# Patient Record
Sex: MALE | URBAN - METROPOLITAN AREA
[De-identification: names, ages, dates, MRNs, and addresses within clinical notes are randomized per-mention and may not be internally consistent; named-entity substitution may affect disease eponyms.]

---

## 2017-08-18 PROBLEM — 16331000 HEARTBURN: Status: ACTIVE | Noted: 2017-08-18

## 2021-11-18 ENCOUNTER — OFFICE VISIT (OUTPATIENT)
Dept: URBAN - METROPOLITAN AREA CLINIC 37 | Facility: CLINIC | Age: 56
End: 2021-11-18

## 2021-11-18 ENCOUNTER — LAB OUTSIDE AN ENCOUNTER (OUTPATIENT)
Dept: URBAN - METROPOLITAN AREA CLINIC 37 | Facility: CLINIC | Age: 56
End: 2021-11-18

## 2021-11-18 VITALS — BODY MASS INDEX: 21.82 KG/M2 | WEIGHT: 144 LBS | HEIGHT: 68 IN

## 2021-11-18 PROBLEM — 312824007: Status: ACTIVE | Noted: 2021-11-18

## 2021-11-18 PROBLEM — 59621000 ESSENTIAL HYPERTENSION: Status: ACTIVE | Noted: 2017-08-18

## 2021-11-18 PROBLEM — 429699009: Status: ACTIVE | Noted: 2021-11-18

## 2021-11-18 PROBLEM — 313436004 TYPE II DIABETES MELLITUS WITHOUT COMPLICATION: Status: ACTIVE | Noted: 2017-08-18

## 2021-11-18 PROBLEM — 266433003: Status: ACTIVE | Noted: 2021-11-18

## 2021-11-18 PROBLEM — 429699009 HISTORY OF MALIGNANT NEOPLASM OF COLON: Status: ACTIVE | Noted: 2017-08-18

## 2021-11-18 RX ORDER — EMPAGLIFLOZIN, METFORMIN HYDROCHLORIDE 12.5; 1 MG/1; MG/1
1 TABLET WITH BREAKFAST TABLET, EXTENDED RELEASE ORAL ONCE A DAY
Status: ACTIVE | COMMUNITY

## 2021-11-18 RX ORDER — DORZOLAMIDE HYDROCHLORIDE AND TIMOLOL MALEATE 20; 5 MG/ML; MG/ML
1 DROP INTO AFFECTED EYE SOLUTION/ DROPS OPHTHALMIC TWICE A DAY
Status: ACTIVE | COMMUNITY

## 2021-11-18 RX ORDER — TAFLUPROST 0 MG/.3ML
1 DROP INTO AFFECTED EYE IN THE EVENING SOLUTION/ DROPS OPHTHALMIC ONCE A DAY
Status: ACTIVE | COMMUNITY

## 2021-11-18 NOTE — HPI-MIGRATED HPI
Colorectal cancer screening : Patient is here for -> high risk surveillance colonoscopy due to personal history of colonic polyps;   Colorectal cancer screening : Last colonoscopy was -> 08/31/2017 performed by me at [facility], two polyps measuring 5 mm were detected and resected from the cecum (benign lymphoid aggregate x1) and sigmoid colon (benign polypoid mucosa, negative for adenoma x1). Patent end-to-side ileo-colonic anastomosis with normal mucosal appearance. Non-bleeding grade II internal hemorrhoids were found;   Colorectal cancer screening : Patients denies -> rectal bleeding, change in bowel habits, constipation, diarrhea, or abdominal pain;   Colorectal cancer screening : Family history of GI malignancy: -> Denies;   Colorectal cancer screening : Current bowel movement patterns -> 1 formed BM daily;   Follow up OV : Patient is here for a routine OV for -> GERD  Last EGD done 08/31/2017 due to heartburn, The esophagus was normal with bxx showing no diagnostic abnormality. The GE junction was irregular. Mild gastritis was found in the gastric body with bxx showing mild chronic inactive gastritis. Mild gastritis in the gastric antrum with bxx showing reactive gastropathy. No evidence of H. pylori or IM. Normal duodenum.  Patient is not taking any antacids/PPI currently b/c he is asymptomatic;   Follow up OV : Patient is on -> ;   Follow up OV : Current symptoms are -> Denies heartburn, acid reflux, early satiety, odynophagia/dyspahgia;     Colorectal cancer screening : Denies dysphagia or odynophagia Currently taking anticoagulants/NSAIDs: Denies;

## 2021-12-01 ENCOUNTER — OFFICE VISIT (OUTPATIENT)
Dept: URBAN - METROPOLITAN AREA SURGERY CENTER 8 | Facility: SURGERY CENTER | Age: 56
End: 2021-12-01